# Patient Record
Sex: MALE | Race: WHITE
[De-identification: names, ages, dates, MRNs, and addresses within clinical notes are randomized per-mention and may not be internally consistent; named-entity substitution may affect disease eponyms.]

---

## 2018-01-15 ENCOUNTER — HOSPITAL ENCOUNTER (OUTPATIENT)
Dept: HOSPITAL 45 - C.RADBC | Age: 53
Discharge: HOME | End: 2018-01-15
Attending: NURSE PRACTITIONER
Payer: COMMERCIAL

## 2018-01-15 ENCOUNTER — HOSPITAL ENCOUNTER (OUTPATIENT)
Dept: HOSPITAL 45 - C.LAB1850 | Age: 53
Discharge: HOME | End: 2018-01-15
Attending: NURSE PRACTITIONER
Payer: COMMERCIAL

## 2018-01-15 DIAGNOSIS — R73.9: ICD-10-CM

## 2018-01-15 DIAGNOSIS — I10: ICD-10-CM

## 2018-01-15 DIAGNOSIS — K21.9: ICD-10-CM

## 2018-01-15 DIAGNOSIS — J45.909: Primary | ICD-10-CM

## 2018-01-15 DIAGNOSIS — E78.5: ICD-10-CM

## 2018-01-15 DIAGNOSIS — M25.562: ICD-10-CM

## 2018-01-15 DIAGNOSIS — M25.561: Primary | ICD-10-CM

## 2018-01-15 LAB
ALBUMIN SERPL-MCNC: 3.9 GM/DL (ref 3.4–5)
ALP SERPL-CCNC: 108 U/L (ref 45–117)
ALT SERPL-CCNC: 58 U/L (ref 12–78)
AST SERPL-CCNC: 31 U/L (ref 15–37)
BUN SERPL-MCNC: 15 MG/DL (ref 7–18)
CALCIUM SERPL-MCNC: 9.4 MG/DL (ref 8.5–10.1)
CO2 SERPL-SCNC: 26 MMOL/L (ref 21–32)
CREAT SERPL-MCNC: 1.31 MG/DL (ref 0.6–1.4)
CREAT UR-MCNC: 245 MG/DL
GLUCOSE SERPL-MCNC: 120 MG/DL (ref 70–99)
HBA1C MFR BLD: 6.5 % (ref 4.5–5.6)
KETONES UR QL STRIP: 92 MG/DL
PH UR: 165 MG/DL (ref 0–200)
POTASSIUM SERPL-SCNC: 3.5 MMOL/L (ref 3.5–5.1)
PROT SERPL-MCNC: 8 GM/DL (ref 6.4–8.2)
SODIUM SERPL-SCNC: 139 MMOL/L (ref 136–145)

## 2018-01-15 NOTE — DIAGNOSTIC IMAGING REPORT
L KNEE 1 OR 2 VIEWS ROUTINE, R KNEE 1 OR 2 VIEWS ROUTINE



HISTORY:  53 years-old Male PAIN acute bilateral knee pain



COMPARISON: None available



TECHNIQUE: 2 standing views of the bilateral knees



FINDINGS: 



RIGHT:

 Small joint effusion. Minimal marginal spurring of the patella. No significant

degenerative changes. No acute fracture or subluxation.



LEFT:

No acute fracture or subluxation. Small joint effusion is suspected. No

significant degenerative changes.



IMPRESSION: 

1. No significant degenerative changes, acute fracture or subluxation.

2. Suspected small bilateral joint effusions. 







The above report was generated using voice recognition software. It may contain

grammatical, syntax or spelling errors.







Electronically signed by:  Anand Valverde M.D.

1/15/2018 10:20 AM



Dictated Date/Time:  1/15/2018 10:18 AM

## 2018-01-17 ENCOUNTER — HOSPITAL ENCOUNTER (OUTPATIENT)
Dept: HOSPITAL 45 - C.LAB1850 | Age: 53
Discharge: HOME | End: 2018-01-17
Attending: INTERNAL MEDICINE
Payer: COMMERCIAL

## 2018-01-17 DIAGNOSIS — J45.40: Primary | ICD-10-CM

## 2018-01-17 LAB
BASOPHILS # BLD: 0.04 K/UL (ref 0–0.2)
BASOPHILS NFR BLD: 0.4 %
EOS ABS #: 0.45 K/UL (ref 0–0.5)
EOSINOPHIL NFR BLD AUTO: 425 K/UL (ref 130–400)
HCT VFR BLD CALC: 44.2 % (ref 42–52)
HGB BLD-MCNC: 15 G/DL (ref 14–18)
IG#: 0.06 K/UL (ref 0–0.02)
IMM GRANULOCYTES NFR BLD AUTO: 39.5 %
LYMPHOCYTES # BLD: 4.4 K/UL (ref 1.2–3.4)
MCH RBC QN AUTO: 31.1 PG (ref 25–34)
MCHC RBC AUTO-ENTMCNC: 33.9 G/DL (ref 32–36)
MCV RBC AUTO: 91.5 FL (ref 80–100)
MONO ABS #: 0.83 K/UL (ref 0.11–0.59)
MONOCYTES NFR BLD: 7.5 %
NEUT ABS #: 5.35 K/UL (ref 1.4–6.5)
NEUTROPHILS # BLD AUTO: 4 %
NEUTROPHILS NFR BLD AUTO: 48.1 %
PMV BLD AUTO: 9.8 FL (ref 7.4–10.4)
RED CELL DISTRIBUTION WIDTH CV: 14.1 % (ref 11.5–14.5)
RED CELL DISTRIBUTION WIDTH SD: 46.7 FL (ref 36.4–46.3)
WBC # BLD AUTO: 11.13 K/UL (ref 4.8–10.8)

## 2019-06-10 NOTE — DIAGNOSTIC IMAGING REPORT
L KNEE 1 OR 2 VIEWS ROUTINE, R KNEE 1 OR 2 VIEWS ROUTINE



HISTORY:  53 years-old Male PAIN acute bilateral knee pain



COMPARISON: None available



TECHNIQUE: 2 standing views of the bilateral knees



FINDINGS: 



RIGHT:

 Small joint effusion. Minimal marginal spurring of the patella. No significant

degenerative changes. No acute fracture or subluxation.



LEFT:

No acute fracture or subluxation. Small joint effusion is suspected. No

significant degenerative changes.



IMPRESSION: 

1. No significant degenerative changes, acute fracture or subluxation.

2. Suspected small bilateral joint effusions. 







The above report was generated using voice recognition software. It may contain

grammatical, syntax or spelling errors.







Electronically signed by:  Anand Valverde M.D.

1/15/2018 10:20 AM



Dictated Date/Time:  1/15/2018 10:18 AM none